# Patient Record
Sex: FEMALE | Race: BLACK OR AFRICAN AMERICAN
[De-identification: names, ages, dates, MRNs, and addresses within clinical notes are randomized per-mention and may not be internally consistent; named-entity substitution may affect disease eponyms.]

---

## 2020-02-14 ENCOUNTER — HOSPITAL ENCOUNTER (EMERGENCY)
Dept: HOSPITAL 76 - ED | Age: 35
Discharge: HOME | End: 2020-02-14
Payer: COMMERCIAL

## 2020-02-14 VITALS — DIASTOLIC BLOOD PRESSURE: 88 MMHG | SYSTOLIC BLOOD PRESSURE: 140 MMHG

## 2020-02-14 DIAGNOSIS — F17.200: ICD-10-CM

## 2020-02-14 DIAGNOSIS — M54.42: Primary | ICD-10-CM

## 2020-02-14 PROCEDURE — 99283 EMERGENCY DEPT VISIT LOW MDM: CPT

## 2020-02-14 PROCEDURE — 99284 EMERGENCY DEPT VISIT MOD MDM: CPT

## 2020-02-14 PROCEDURE — 96372 THER/PROPH/DIAG INJ SC/IM: CPT

## 2020-02-14 NOTE — ED PHYSICIAN DOCUMENTATION
History of Present Illness





- Stated complaint


Stated Complaint: BACK PX





- Chief complaint


Chief Complaint: Back Pain





- History obtained from


History obtained from: Patient (the patient is a very pleasant 36 Y/O F who has 

chronic back pain from bulging discs in her lumbar spine according to the 

patient. Her  is AD [facility] and they PCS 2 days ago from [place]. She 

complains of left sided lower back pain that radiates down to her left lower 

extremity and she is on chronic norco 10 mg every 4 hours at home and has a 

current prescription.The patient denies any bowel or bladder dysfunction she 

denies any saddle anesthesia she denies any fevers or headaches or neck pain.)





Review of Systems


Constitutional: reports: Reviewed and negative


Eyes: reports: Reviewed and negative


Ears: reports: Reviewed and negative


Nose: reports: Reviewed and negative


Throat: reports: Reviewed and negative


Cardiac: reports: Reviewed and negative


Respiratory: reports: Reviewed and negative


GI: reports: Reviewed and negative


: reports: Reviewed and negative


Skin: reports: Reviewed and negative


Musculoskeletal: reports: Back pain


Neurologic: reports: Reviewed and negative


Psychiatric: reports: Reviewed and negative


Endocrine: reports: Reviewed and negative


Immunocompromised: reports: Reviewed and negative





PD PAST MEDICAL HISTORY





- Past Medical History


Past Medical History: Yes


Cardiovascular: Hypertension


Musculoskeletal: Chronic back pain





- Past Surgical History


Past Surgical History: Yes


Ortho: Other





- Present Medications


Home Medications: 


                                Ambulatory Orders











 Medication  Instructions  Recorded  Confirmed


 


ARIPiprazole [Abilify] 5 mg PO DAILY 02/14/20 02/14/20


 


Methylprednisolone [Medrol Dose 1 each PO .PACKAGEINSTRUCTIONS 6 02/14/20 





Pack] Days #1 each  


 


Naproxen Sodium [Naproxen Sodium 500 mg PO 02/14/20 





ER]   


 


Oxycodone HCl/Acetaminophen  02/14/20 





[Oxycodone-Acetaminophen ]   


 


Sertraline HCl  02/14/20 


 


lisinopriL [Lisinopril] 10 mg PO 02/14/20 


 


oxyCODONE/ACET 5/325 [Percocet 5 1 - 2 tab PO Q4HR PRN 02/14/20 02/14/20





mg/325 mg]   














- Allergies


Allergies/Adverse Reactions: 


                                    Allergies











Allergy/AdvReac Type Severity Reaction Status Date / Time


 


No Known Drug Allergies Allergy   Verified 02/14/20 19:43














- Social History


Does the pt smoke?: Yes


Smoking Status: Current every day smoker


Does the pt drink ETOH?: No


Does the pt have substance abuse?: Yes


Substance Use and Type: Marijuana





- Immunizations


Immunizations are current?: Yes





- POLST


Patient has POLST: No





PD ED PE NORMAL





- Vitals


Vital signs reviewed: Yes





- General


General: Alert and oriented X 3, No acute distress





- HEENT


HEENT: PERRL





- Neck


Neck: Supple, no meningeal sign





- Cardiac


Cardiac: RRR, No murmur





- Respiratory


Respiratory: Clear bilaterally





- Abdomen


Abdomen: Normal bowel sounds, Soft, Non tender, Non distended





- Back


Back: No spinal TTP, Other (There is no midline tenderness to palpation)





- Derm


Derm: Warm and dry





- Extremities


Extremities: No deformity, No calf tenderness / cord, Other (Positive straight 

leg test on the left.Patient is able to ambulate her reflexes are 2+ 

bilaterallyAchilles and patellar.  Sensations intact to light touch 

proprioception is intact to the left great toe)





- Neuro


Neuro: Alert and oriented X 3, CNs 2-12 intact, No motor deficit, No sensory 

deficit, Normal speech





- Psych


Psych: Normal mood, Normal affect





Results





- Vitals


Vitals: 


                               Vital Signs - 24 hr











  02/14/20





  19:35


 


Temperature 36.5 C


 


Heart Rate 92


 


Respiratory 20





Rate 


 


Blood Pressure 127/86 H


 


O2 Saturation 100








                                     Oxygen











O2 Source                      Room air

















PD MEDICAL DECISION MAKING





- ED course


Complexity details: re-evaluated patient, considered differential (Patient just 

moved to the area 2 days ago with chronic back pain with sounds like chronic 

lumbar radiculopathy she is managed on Norco 10 mg every 4 hours as an 

outpatient today complaining of worsening left leg pain and left-sided lower 

back pain without bowel or bladder dysfunction or saddle anesthesia plan is for 

pain control and close follow-up.  Patient has no red flags on exam or history 

concerning for epidural abscess or epidural hematoma or cauda equina syndrome.),

d/w patient





Departure





- Departure


Disposition: 01 Home, Self Care


Clinical Impression: 


Back pain


Qualifiers:


 Back pain location: low back pain Chronicity: chronic Back pain laterality: 

left Sciatica presence: with sciatica Sciatica laterality: sciatica of left side

Qualified Code(s): M54.42 - Lumbago with sciatica, left side; G89.29 - Other 

chronic pain





Condition: Good


Instructions:  ED Low Back Pain Injury


Follow-Up: 


YOUR,DOCTOR [Other]


Prescriptions: 


Methylprednisolone [Medrol Dose Pack] 1 each PO .PACKAGEINSTRUCTIONS 6 Days #1 

each

## 2020-10-26 ENCOUNTER — HOSPITAL ENCOUNTER (EMERGENCY)
Dept: HOSPITAL 76 - ED | Age: 35
Discharge: HOME | End: 2020-10-26
Payer: COMMERCIAL

## 2020-10-26 VITALS — SYSTOLIC BLOOD PRESSURE: 155 MMHG | DIASTOLIC BLOOD PRESSURE: 84 MMHG

## 2020-10-26 DIAGNOSIS — F17.200: ICD-10-CM

## 2020-10-26 DIAGNOSIS — I10: ICD-10-CM

## 2020-10-26 DIAGNOSIS — M54.9: ICD-10-CM

## 2020-10-26 DIAGNOSIS — R07.89: Primary | ICD-10-CM

## 2020-10-26 DIAGNOSIS — R00.0: ICD-10-CM

## 2020-10-26 LAB
ALBUMIN DIAFP-MCNC: 4.1 G/DL (ref 3.2–5.5)
ALBUMIN/GLOB SERPL: 1.4 {RATIO} (ref 1–2.2)
ALP SERPL-CCNC: 67 IU/L (ref 42–121)
ALT SERPL W P-5'-P-CCNC: 13 IU/L (ref 10–60)
ANION GAP SERPL CALCULATED.4IONS-SCNC: 11 MMOL/L (ref 6–13)
AST SERPL W P-5'-P-CCNC: 14 IU/L (ref 10–42)
BASOPHILS NFR BLD AUTO: 0 10^3/UL (ref 0–0.1)
BASOPHILS NFR BLD AUTO: 0.6 %
BILIRUB BLD-MCNC: 0.5 MG/DL (ref 0.2–1)
BUN SERPL-MCNC: 8 MG/DL (ref 6–20)
CALCIUM UR-MCNC: 9.5 MG/DL (ref 8.5–10.3)
CHLORIDE SERPL-SCNC: 102 MMOL/L (ref 101–111)
CLARITY UR REFRACT.AUTO: CLEAR
CO2 SERPL-SCNC: 22 MMOL/L (ref 21–32)
CREAT SERPLBLD-SCNC: 0.7 MG/DL (ref 0.4–1)
EOSINOPHIL # BLD AUTO: 0.2 10^3/UL (ref 0–0.7)
EOSINOPHIL NFR BLD AUTO: 2.4 %
ERYTHROCYTE [DISTWIDTH] IN BLOOD BY AUTOMATED COUNT: 17.3 % (ref 12–15)
GLOBULIN SER-MCNC: 3 G/DL (ref 2.1–4.2)
GLUCOSE SERPL-MCNC: 97 MG/DL (ref 70–100)
GLUCOSE UR QL STRIP.AUTO: NEGATIVE MG/DL
HCG UR QL: NEGATIVE
HGB UR QL STRIP: 12.7 G/DL (ref 12–16)
KETONES UR QL STRIP.AUTO: NEGATIVE MG/DL
LIPASE SERPL-CCNC: 24 U/L (ref 22–51)
LYMPHOCYTES # SPEC AUTO: 3.2 10^3/UL (ref 1.5–3.5)
LYMPHOCYTES NFR BLD AUTO: 46.6 %
MCH RBC QN AUTO: 23.6 PG (ref 27–31)
MCHC RBC AUTO-ENTMCNC: 31.3 G/DL (ref 32–36)
MCV RBC AUTO: 75.6 FL (ref 81–99)
MONOCYTES # BLD AUTO: 0.6 10^3/UL (ref 0–1)
MONOCYTES NFR BLD AUTO: 7.9 %
NEUTROPHILS # BLD AUTO: 3 10^3/UL (ref 1.5–6.6)
NEUTROPHILS # SNV AUTO: 7 X10^3/UL (ref 4.8–10.8)
NEUTROPHILS NFR BLD AUTO: 42.4 %
NITRITE UR QL STRIP.AUTO: NEGATIVE
PDW BLD AUTO: 10 FL (ref 7.9–10.8)
PH UR STRIP.AUTO: 5.5 PH (ref 5–7.5)
PLATELET # BLD: 265 10^3/UL (ref 130–450)
PROT SPEC-MCNC: 7.1 G/DL (ref 6.7–8.2)
PROT UR STRIP.AUTO-MCNC: NEGATIVE MG/DL
RBC # UR STRIP.AUTO: NEGATIVE /UL
RBC MAR: 5.37 10^6/UL (ref 4.2–5.4)
SODIUM SERPLBLD-SCNC: 135 MMOL/L (ref 135–145)
SP GR UR STRIP.AUTO: >=1.03 (ref 1–1.03)
SP GR UR STRIP.AUTO: >=1.03 (ref 1–1.03)
UROBILINOGEN UR QL STRIP.AUTO: (no result) E.U./DL
UROBILINOGEN UR STRIP.AUTO-MCNC: NEGATIVE MG/DL

## 2020-10-26 PROCEDURE — 81003 URINALYSIS AUTO W/O SCOPE: CPT

## 2020-10-26 PROCEDURE — 87086 URINE CULTURE/COLONY COUNT: CPT

## 2020-10-26 PROCEDURE — 84484 ASSAY OF TROPONIN QUANT: CPT

## 2020-10-26 PROCEDURE — 36415 COLL VENOUS BLD VENIPUNCTURE: CPT

## 2020-10-26 PROCEDURE — 96374 THER/PROPH/DIAG INJ IV PUSH: CPT

## 2020-10-26 PROCEDURE — 71275 CT ANGIOGRAPHY CHEST: CPT

## 2020-10-26 PROCEDURE — 81025 URINE PREGNANCY TEST: CPT

## 2020-10-26 PROCEDURE — 81001 URINALYSIS AUTO W/SCOPE: CPT

## 2020-10-26 PROCEDURE — 99284 EMERGENCY DEPT VISIT MOD MDM: CPT

## 2020-10-26 PROCEDURE — 85025 COMPLETE CBC W/AUTO DIFF WBC: CPT

## 2020-10-26 PROCEDURE — 93005 ELECTROCARDIOGRAM TRACING: CPT

## 2020-10-26 PROCEDURE — 83690 ASSAY OF LIPASE: CPT

## 2020-10-26 PROCEDURE — 71045 X-RAY EXAM CHEST 1 VIEW: CPT

## 2020-10-26 PROCEDURE — 99285 EMERGENCY DEPT VISIT HI MDM: CPT

## 2020-10-26 PROCEDURE — 80053 COMPREHEN METABOLIC PANEL: CPT

## 2020-10-26 NOTE — CT REPORT
PROCEDURE:  ANGIO CHEST W/WO

 

INDICATIONS:  chest pain, pe protocol

 

CONTRAST:  IV CONTRAST: Optiray 320 ml: 100 PO CONTRAST: *NO PO CONTRAST 

 

TECHNIQUE:  

After the administration of intravenous contrast, 2 mm thick sections acquired from the pulmonary api
chalo to the posterior costophrenic angles.  3-dimensional maximum intensity projection (MIP) coronal a
nd sagittal reformats were then acquired through the thorax. For radiation dose reduction, the follow
ing was used:  automated exposure control, adjustment of mA and/or kV according to patient size. 

 

COMPARISON:  Chest radiograph 10/26/2020.

 

FINDINGS:  

Image quality:  Excellent.  

 

Pulmonary arteries:  Pulmonary arteries are normal in size, and demonstrate no intraluminal filling d
efects to suggest central pulmonary embolism.  

 

Lungs and pleura: There is mild dependent atelectasis at the right lung base. The lungs are otherwise
 clear.  No pleural effusions or pneumothorax.  Central and peripheral airways are patent.  

 

Mediastinum:  Heart size is normal, without pericardial effusion.  No mediastinal or hilar adenopathy
.  Thoracic aorta is normal in caliber and enhancement.  Esophagus is normal in caliber, without hiat
al hernia.  

 

Bones and chest wall:  No suspicious bony lesions.  Ribs and thoracic spine appear intact throughout.
  The thyroid is normal.  No axillary or supraclavicular adenopathy.  

 

Abdomen:  Visualized upper abdominal solid organs appear normal in the early arterial phase of enhanc
ement.  

 

IMPRESSION:  

No acute pulmonary embolus. No acute abnormality is identified in the chest.

 

Reviewed by: Xiang Tay MD on 10/26/2020 8:39 PM PDT

Approved by: Xiang Tay MD on 10/26/2020 8:39 PM PDT

 

 

Station ID:  [id number]

## 2020-10-26 NOTE — ED PHYSICIAN DOCUMENTATION
PD HPI CHEST PAIN





- Stated complaint


Stated Complaint: CP





- Chief complaint


Chief Complaint: Cardiac





- History obtained from


History obtained from: Patient





- Additional information


Additional information: 





35-year-old woman has had severe left-sided chest pain radiating to the side and

back for the last 6 or 7 days.  It is constant.  Worsening with twisting and 

deep breathing.  She is short of breath on exertion with it.  No history of 

heart problems other than hypertension for which she takes lisinopril 10 mg a 

day.  No history of DVT or PE.





Review of Systems


Ten Systems: 10 systems reviewed and negative


Constitutional: denies: Fever, Chills


Cardiac: reports: Chest pain / pressure.  denies: Pedal edema, Calf pain


Respiratory: reports: Dyspnea.  denies: Cough


GI: denies: Abdominal Pain





PD PAST MEDICAL HISTORY





- Past Medical History


Cardiovascular: Hypertension


Musculoskeletal: Chronic back pain





- Past Surgical History


Past Surgical History: Yes


Ortho: Other





- Present Medications


Home Medications: 


                                Ambulatory Orders











 Medication  Instructions  Recorded  Confirmed


 


ARIPiprazole [Abilify] 5 mg PO DAILY 02/14/20 02/14/20


 


Methylprednisolone [Medrol Dose 1 each PO .PACKAGEINSTRUCTIONS 6 02/14/20 





Pack] Days #1 each  


 


Naproxen Sodium [Naproxen Sodium 500 mg PO 02/14/20 





ER]   


 


Oxycodone HCl/Acetaminophen  02/14/20 





[Oxycodone-Acetaminophen ]   


 


Sertraline HCl  02/14/20 


 


lisinopriL [Lisinopril] 10 mg PO 02/14/20 


 


oxyCODONE/ACET 5/325 [Percocet 5 1 - 2 tab PO Q4HR PRN 02/14/20 02/14/20





mg/325 mg]   


 


Ibuprofen [Motrin] 800 mg PO Q8H PRN #30 tablet 10/26/20 


 


Oxycodone HCl/Acetaminophen 1 - 2 each PO Q6H PRN #14 tablet 10/26/20 





[Percocet 5-325 mg Tablet]   














- Allergies


Allergies/Adverse Reactions: 


                                    Allergies











Allergy/AdvReac Type Severity Reaction Status Date / Time


 


No Known Drug Allergies Allergy   Verified 10/26/20 18:17














- Social History


Does the pt smoke?: Yes


Smoking Status: Current every day smoker


Does the pt drink ETOH?: No


Does the pt have substance abuse?: Yes





- Family History


Family history: reports: Non contributory





- Immunizations


Immunizations are current?: Yes





- POLST


Patient has POLST: No





PD ED PE NORMAL





- Vitals


Vital signs reviewed: Yes





- General


General: Alert and oriented X 3, Other (appears uncomfortable)





- HEENT


HEENT: PERRL, EOMI





- Neck


Neck: Supple, no meningeal sign, No bony TTP





- Cardiac


Cardiac: No murmur, Other (tachy, reg)





- Respiratory


Respiratory: No respiratory distress, Clear bilaterally





- Abdomen


Abdomen: Non tender





- Back


Back: No CVA TTP, No spinal TTP





- Derm


Derm: Normal color, Warm and dry





- Extremities


Extremities: No edema, No calf tenderness / cord





- Neuro


Neuro: Alert and oriented X 3, Normal speech





Results





- Vitals


Vitals: 


                               Vital Signs - 24 hr











  10/26/20 10/26/20 10/26/20





  18:12 18:40 19:35


 


Temperature 36.7 C  


 


Heart Rate 104 H 87 78


 


Respiratory 20 14 15





Rate   


 


Blood Pressure 179/98 H 181/115 H 141/87 H


 


O2 Saturation 100 100 100














  10/26/20 10/26/20





  20:12 20:30


 


Temperature  


 


Heart Rate 79 93


 


Respiratory 10 L 18





Rate  


 


Blood Pressure 145/91 H 155/84 H


 


O2 Saturation 100 100








                                     Oxygen











O2 Source                      Room air

















- EKG (time done)


  ** 1817


Rate: Rate (enter#) (101)


Rhythm: Sinus tachycardia


Axis: Normal


Intervals: Normal FL


QRS: Normal


Ischemia: Normal ST segments


Computer interpretation: Agree with computer





- Labs


Labs: 


                                Laboratory Tests











  10/26/20 10/26/20 10/26/20





  18:20 18:26 19:15


 


WBC    7.0


 


RBC    5.37


 


Hgb    12.7


 


Hct    40.6


 


MCV    75.6 L


 


MCH    23.6 L


 


MCHC    31.3 L


 


RDW    17.3 H


 


Plt Count    265


 


MPV    10.0


 


Neut # (Auto)    3.0


 


Lymph # (Auto)    3.2


 


Mono # (Auto)    0.6


 


Eos # (Auto)    0.2


 


Baso # (Auto)    0.0


 


Absolute Nucleated RBC    0.00


 


Nucleated RBC %    0.0


 


Sodium   


 


Potassium   


 


Chloride   


 


Carbon Dioxide   


 


Anion Gap   


 


BUN   


 


Creatinine   


 


Estimated GFR (MDRD)   


 


Glucose   


 


Calcium   


 


Total Bilirubin   


 


AST   


 


ALT   


 


Alkaline Phosphatase   


 


Troponin I High Sens   


 


Total Protein   


 


Albumin   


 


Globulin   


 


Albumin/Globulin Ratio   


 


Lipase   


 


Urine Color  YELLOW  


 


Urine Clarity  CLEAR  


 


Urine pH  5.5  


 


Ur Specific Gravity  >=1.030 H  >=1.030 H 


 


Urine Protein  NEGATIVE  


 


Urine Glucose (UA)  NEGATIVE  


 


Urine Ketones  NEGATIVE  


 


Urine Occult Blood  NEGATIVE  


 


Urine Nitrite  NEGATIVE  


 


Urine Bilirubin  NEGATIVE  


 


Urine Urobilinogen  0.2 (NORMAL)  


 


Ur Leukocyte Esterase  NEGATIVE  


 


Ur Microscopic Review  NOT INDICATED  


 


Urine Culture Comments  NOT INDICATED  


 


Urine HCG, Qual   NEGATIVE 














  10/26/20 10/26/20





  19:15 19:15


 


WBC  


 


RBC  


 


Hgb  


 


Hct  


 


MCV  


 


MCH  


 


MCHC  


 


RDW  


 


Plt Count  


 


MPV  


 


Neut # (Auto)  


 


Lymph # (Auto)  


 


Mono # (Auto)  


 


Eos # (Auto)  


 


Baso # (Auto)  


 


Absolute Nucleated RBC  


 


Nucleated RBC %  


 


Sodium  135 


 


Potassium  3.9 


 


Chloride  102 


 


Carbon Dioxide  22 


 


Anion Gap  11.0 


 


BUN  8 


 


Creatinine  0.7 


 


Estimated GFR (MDRD)  114 


 


Glucose  97 


 


Calcium  9.5 


 


Total Bilirubin  0.5 


 


AST  14 


 


ALT  13 


 


Alkaline Phosphatase  67 


 


Troponin I High Sens   < 2.3 L


 


Total Protein  7.1 


 


Albumin  4.1 


 


Globulin  3.0 


 


Albumin/Globulin Ratio  1.4 


 


Lipase  24 


 


Urine Color  


 


Urine Clarity  


 


Urine pH  


 


Ur Specific Gravity  


 


Urine Protein  


 


Urine Glucose (UA)  


 


Urine Ketones  


 


Urine Occult Blood  


 


Urine Nitrite  


 


Urine Bilirubin  


 


Urine Urobilinogen  


 


Ur Leukocyte Esterase  


 


Ur Microscopic Review  


 


Urine Culture Comments  


 


Urine HCG, Qual  














- Rads (name of study)


  ** CTA Chest


Radiology: EMP read contemporaneously (NAD)





PD MEDICAL DECISION MAKING





- ED course


ED course: 





35-year-old woman presents with severe left-sided chest pain also to the back.  

Differential diagnosis includes PE, dissection, atypical ACS, pleurisy, muscular

pain.  CT angiogram of the chest was done without evidence of PE or dissection. 

Troponin was negative which given the time course and atypical nature as well as

young age rules her out.  Pleurisy and muscular pain are still on the 

differential and she was so advised.





Departure





- Departure


Disposition: 01 Home, Self Care


Clinical Impression: 


 Chest wall pain





Condition: Good


Record reviewed to determine appropriate education?: Yes


Instructions:  ED Chest Pain NonCardiac


Prescriptions: 


Ibuprofen [Motrin] 800 mg PO Q8H PRN #30 tablet


 PRN Reason: PAIN &/OR FEVER


Oxycodone HCl/Acetaminophen [Percocet 5-325 mg Tablet] 1 - 2 each PO Q6H PRN #14

tablet


 PRN Reason: pain


Comments: 


You were seen today for chest pain, thankfully all the testing was negative 

which rules out blood clot, heart issue, aortic dissection.  Still could be 

muscular or pleurisy.  Take the medications as needed.  Return for new or 

worsening symptoms and follow-up with your primary care physician, next 

available appointment.

## 2020-10-26 NOTE — XRAY REPORT
PROCEDURE:  Chest 1 View X-Ray

 

INDICATIONS:  chest pain

 

TECHNIQUE: 2 AP views of the chest acquired.  

 

COMPARISON:  None.

 

FINDINGS:  

 

Surgical changes and devices:  None.  

 

Lungs and pleura:  No pleural effusions or pneumothorax.  Lungs are clear.  

 

Mediastinum:  Mediastinal contours appear normal.  Heart size is normal.  

 

Bones and chest wall:  No suspicious bony lesions.  Overlying soft tissues appear unremarkable.  

 

IMPRESSION:  

No acute cardiopulmonary abnormality.

 

Reviewed by: Xiang Tay MD on 10/26/2020 6:55 PM PDT

Approved by: Xiang Tay MD on 10/26/2020 6:55 PM PDT

 

 

Station ID:  [id number]